# Patient Record
Sex: MALE | Race: BLACK OR AFRICAN AMERICAN | URBAN - METROPOLITAN AREA
[De-identification: names, ages, dates, MRNs, and addresses within clinical notes are randomized per-mention and may not be internally consistent; named-entity substitution may affect disease eponyms.]

---

## 2019-12-12 ENCOUNTER — ED HISTORICAL/CONVERTED ENCOUNTER (OUTPATIENT)
Dept: OTHER | Age: 64
End: 2019-12-12

## 2020-06-23 ENCOUNTER — ED HISTORICAL/CONVERTED ENCOUNTER (OUTPATIENT)
Dept: OTHER | Age: 65
End: 2020-06-23

## 2020-06-26 ENCOUNTER — ED HISTORICAL/CONVERTED ENCOUNTER (OUTPATIENT)
Dept: OTHER | Age: 65
End: 2020-06-26

## 2020-06-29 ENCOUNTER — IP HISTORICAL/CONVERTED ENCOUNTER (OUTPATIENT)
Dept: OTHER | Age: 65
End: 2020-06-29

## 2020-07-28 ENCOUNTER — IP HISTORICAL/CONVERTED ENCOUNTER (OUTPATIENT)
Dept: OTHER | Age: 65
End: 2020-07-28

## 2022-04-04 ENCOUNTER — HOSPITAL ENCOUNTER (EMERGENCY)
Age: 67
Discharge: HOME | End: 2022-04-04
Attending: STUDENT IN AN ORGANIZED HEALTH CARE EDUCATION/TRAINING PROGRAM
Payer: COMMERCIAL

## 2022-04-04 VITALS
WEIGHT: 205 LBS | SYSTOLIC BLOOD PRESSURE: 154 MMHG | HEART RATE: 98 BPM | HEIGHT: 74 IN | OXYGEN SATURATION: 97 % | RESPIRATION RATE: 20 BRPM | TEMPERATURE: 98.2 F | DIASTOLIC BLOOD PRESSURE: 90 MMHG | BODY MASS INDEX: 26.31 KG/M2

## 2022-04-04 DIAGNOSIS — R55 NEAR SYNCOPE: Primary | ICD-10-CM

## 2022-04-04 LAB
ANION GAP SERPL CALC-SCNC: 5 MMOL/L (ref 5–15)
ATRIAL RATE: 84 BPM
BASOPHILS # BLD: 0 K/UL (ref 0–0.1)
BASOPHILS NFR BLD: 0 % (ref 0–1)
BUN SERPL-MCNC: 17 MG/DL (ref 6–20)
BUN/CREAT SERPL: 18 (ref 12–20)
CA-I BLD-MCNC: 8.6 MG/DL (ref 8.5–10.1)
CALCULATED P AXIS, ECG09: 46 DEGREES
CALCULATED R AXIS, ECG10: -17 DEGREES
CALCULATED T AXIS, ECG11: 46 DEGREES
CHLORIDE SERPL-SCNC: 106 MMOL/L (ref 97–108)
CO2 SERPL-SCNC: 27 MMOL/L (ref 21–32)
CREAT SERPL-MCNC: 0.97 MG/DL (ref 0.7–1.3)
DIAGNOSIS, 93000: NORMAL
DIFFERENTIAL METHOD BLD: ABNORMAL
EOSINOPHIL # BLD: 0 K/UL (ref 0–0.4)
EOSINOPHIL NFR BLD: 1 % (ref 0–7)
ERYTHROCYTE [DISTWIDTH] IN BLOOD BY AUTOMATED COUNT: 15.7 % (ref 11.5–14.5)
GLUCOSE SERPL-MCNC: 118 MG/DL (ref 65–100)
HCT VFR BLD AUTO: 47.5 % (ref 36.6–50.3)
HGB BLD-MCNC: 15 G/DL (ref 12.1–17)
IMM GRANULOCYTES # BLD AUTO: 0 K/UL (ref 0–0.04)
IMM GRANULOCYTES NFR BLD AUTO: 1 % (ref 0–0.5)
LYMPHOCYTES # BLD: 0.9 K/UL (ref 0.8–3.5)
LYMPHOCYTES NFR BLD: 20 % (ref 12–49)
MCH RBC QN AUTO: 26.7 PG (ref 26–34)
MCHC RBC AUTO-ENTMCNC: 31.6 G/DL (ref 30–36.5)
MCV RBC AUTO: 84.7 FL (ref 80–99)
MONOCYTES # BLD: 0.4 K/UL (ref 0–1)
MONOCYTES NFR BLD: 10 % (ref 5–13)
NEUTS SEG # BLD: 3 K/UL (ref 1.8–8)
NEUTS SEG NFR BLD: 68 % (ref 32–75)
NRBC # BLD: 0 K/UL (ref 0–0.01)
NRBC BLD-RTO: 0 PER 100 WBC
P-R INTERVAL, ECG05: 240 MS
PLATELET # BLD AUTO: 182 K/UL (ref 150–400)
PMV BLD AUTO: 9.3 FL (ref 8.9–12.9)
POTASSIUM SERPL-SCNC: 4.1 MMOL/L (ref 3.5–5.1)
Q-T INTERVAL, ECG07: 366 MS
QRS DURATION, ECG06: 80 MS
QTC CALCULATION (BEZET), ECG08: 432 MS
RBC # BLD AUTO: 5.61 M/UL (ref 4.1–5.7)
SODIUM SERPL-SCNC: 138 MMOL/L (ref 136–145)
TROPONIN-HIGH SENSITIVITY: 12 NG/L (ref 0–76)
VENTRICULAR RATE, ECG03: 84 BPM
WBC # BLD AUTO: 4.4 K/UL (ref 4.1–11.1)

## 2022-04-04 PROCEDURE — 85025 COMPLETE CBC W/AUTO DIFF WBC: CPT

## 2022-04-04 PROCEDURE — 99284 EMERGENCY DEPT VISIT MOD MDM: CPT

## 2022-04-04 PROCEDURE — 93005 ELECTROCARDIOGRAM TRACING: CPT

## 2022-04-04 PROCEDURE — 84484 ASSAY OF TROPONIN QUANT: CPT

## 2022-04-04 PROCEDURE — 36415 COLL VENOUS BLD VENIPUNCTURE: CPT

## 2022-04-04 PROCEDURE — 80048 BASIC METABOLIC PNL TOTAL CA: CPT

## 2022-04-04 NOTE — ED PROVIDER NOTES
EMERGENCY DEPARTMENT HISTORY AND PHYSICAL EXAM      Date: 4/4/2022  Patient Name: Gregory Echavarria    History of Presenting Illness     Chief Complaint   Patient presents with    Syncope       History Provided By: Patient    HPI: Gregory Echavarria, 61 y.o. male with a past medical history significant diabetes presents to the ED with cc of weakness, near syncope. Patient states that he was at [address]11 this morning approximately 8 AM, felt suddenly weak, had to sit down on ground. Patient states that episode resolved after several seconds. Did not actually lose consciousness, did not suffer any traumatic injury. Patient denies any chest pains palpitations or shortness of breath. Currently patient states he feels well. There are no other complaints, changes, or physical findings at this time. PCP: None    No current facility-administered medications on file prior to encounter. No current outpatient medications on file prior to encounter. Past History     Past Medical History:  Past Medical History:   Diagnosis Date    Diabetes ([facility] [address]     Psychiatric disorder        Past Surgical History:  History reviewed. No pertinent surgical history. Family History:  History reviewed. No pertinent family history. Social History:  Social History     Tobacco Use    Smoking status: Current Every Day Smoker    Smokeless tobacco: Former User   Substance Use Topics    Alcohol use: Yes     Comment: Occaisional    Drug use: Not Currently       Allergies:  No Known Allergies      Review of Systems     Review of Systems   Constitutional: Negative for activity change, appetite change, chills and fever. HENT: Negative for congestion, sore throat and trouble swallowing. Eyes: Negative for photophobia and visual disturbance. Respiratory: Negative for cough, chest tightness and shortness of breath. Cardiovascular: Negative for chest pain and palpitations.    Gastrointestinal: Negative for abdominal pain and nausea. Genitourinary: Negative for dysuria and flank pain. Musculoskeletal: Negative for arthralgias and neck pain. Skin: Negative for color change and pallor. Neurological: Positive for light-headedness. Negative for dizziness, seizures, syncope, weakness, numbness and headaches. Physical Exam     Physical Exam  Vitals and nursing note reviewed. Constitutional:       Appearance: Normal appearance. He is normal weight. HENT:      Head: Normocephalic and atraumatic. Nose: Nose normal.      Mouth/Throat:      Mouth: Mucous membranes are moist.   Eyes:      Extraocular Movements: Extraocular movements intact. Pupils: Pupils are equal, round, and reactive to light. Cardiovascular:      Rate and Rhythm: Normal rate and regular rhythm. Pulses: Normal pulses. Heart sounds: Normal heart sounds. Pulmonary:      Breath sounds: Normal breath sounds. Abdominal:      General: Abdomen is flat. Bowel sounds are normal.      Palpations: Abdomen is soft. Musculoskeletal:         General: No swelling or tenderness. Normal range of motion. Cervical back: Normal range of motion and neck supple. Skin:     General: Skin is warm and dry. Capillary Refill: Capillary refill takes less than 2 seconds. Neurological:      General: No focal deficit present. Mental Status: He is alert and oriented to person, place, and time. Cranial Nerves: No cranial nerve deficit. Sensory: No sensory deficit. Motor: No weakness.    Psychiatric:         Mood and Affect: Mood normal.         Behavior: Behavior normal.         Diagnostic Study Results     Labs -     Recent Results (from the past 12 hour(s))   EKG, 12 LEAD, INITIAL    Collection Time: 04/04/22  9:40 AM   Result Value Ref Range    Ventricular Rate 84 BPM    Atrial Rate 84 BPM    P-R Interval 240 ms    QRS Duration 80 ms    Q-T Interval 366 ms    QTC Calculation (Bezet) 432 ms    Calculated P Axis 46 degrees Calculated R Axis -17 degrees    Calculated T Axis 46 degrees    Diagnosis       Sinus rhythm with 1st degree A-V block  Otherwise normal ECG  When compared with ECG of 04-APR-2022 09:39, (Unconfirmed)  Abberant conduction is no longer Present  Confirmed by Su Santos ([id number]) on 4/4/2022 11:58:59 AM     CBC WITH AUTOMATED DIFF    Collection Time: 04/04/22  9:52 AM   Result Value Ref Range    WBC 4.4 4.1 - 11.1 K/uL    RBC 5.61 4.10 - 5.70 M/uL    HGB 15.0 12.1 - 17.0 g/dL    HCT 47.5 36.6 - 50.3 %    MCV 84.7 80.0 - 99.0 FL    MCH 26.7 26.0 - 34.0 PG    MCHC 31.6 30.0 - 36.5 g/dL    RDW 15.7 (H) 11.5 - 14.5 %    PLATELET 913 172 - 047 K/uL    MPV 9.3 8.9 - 12.9 FL    NRBC 0.0 0.0  WBC    ABSOLUTE NRBC 0.00 0.00 - 0.01 K/uL    NEUTROPHILS 68 32 - 75 %    LYMPHOCYTES 20 12 - 49 %    MONOCYTES 10 5 - 13 %    EOSINOPHILS 1 0 - 7 %    BASOPHILS 0 0 - 1 %    IMMATURE GRANULOCYTES 1 (H) 0 - 0.5 %    ABS. NEUTROPHILS 3.0 1.8 - 8.0 K/UL    ABS. LYMPHOCYTES 0.9 0.8 - 3.5 K/UL    ABS. MONOCYTES 0.4 0.0 - 1.0 K/UL    ABS. EOSINOPHILS 0.0 0.0 - 0.4 K/UL    ABS. BASOPHILS 0.0 0.0 - 0.1 K/UL    ABS. IMM.  GRANS. 0.0 0.00 - 0.04 K/UL    DF AUTOMATED     METABOLIC PANEL, BASIC    Collection Time: 04/04/22  9:52 AM   Result Value Ref Range    Sodium 138 136 - 145 mmol/L    Potassium 4.1 3.5 - 5.1 mmol/L    Chloride 106 97 - 108 mmol/L    CO2 27 21 - 32 mmol/L    Anion gap 5 5 - 15 mmol/L    Glucose 118 (H) 65 - 100 mg/dL    BUN 17 6 - 20 mg/dL    Creatinine 0.97 0.70 - 1.30 mg/dL    BUN/Creatinine ratio 18 12 - 20      GFR est AA >60 >60 ml/min/1.73m2    GFR est non-AA >60 >60 ml/min/1.73m2    Calcium 8.6 8.5 - 10.1 mg/dL   TROPONIN-HIGH SENSITIVITY    Collection Time: 04/04/22 10:57 AM   Result Value Ref Range    Troponin-High Sensitivity 12 0 - 76 ng/L       Radiologic Studies -   @[email or web address]@  CT Results  (Last 48 hours)    None        CXR Results  (Last 48 hours)    None            Medical Decision Making   I am the first provider for this patient. I reviewed the vital signs, available nursing notes, past medical history, past surgical history, family history and social history. Vital Signs-Reviewed the patient's vital signs. Patient Vitals for the past 12 hrs:   Temp Pulse Resp BP SpO2   04/04/22 1241 98.2 °F (36.8 °C) 98 20 (!) 154/90 97 %   04/04/22 0945 98.5 °F (36.9 °C) 86 21 (!) 150/91 99 %       EKG: Normal sinus rhythm 84, no ST elevations, first-degree heart block    Records Reviewed: Nursing Notes    The patient presents with syncope with a differential diagnosis of vasovagal event, cardiac arrhythmia, heart block,      Provider Notes (Medical Decision Making):     MDM   51-year-old male, no significant past medical history presents to emergency department for near syncopal event today while standing outside of [address], patient did not actually lose consciousness or states felt very weak and dizzy. Episode lasted several seconds, currently patient completely asymptomatic denies any chest pain palpitations or shortness of breath. Physical shows well-appearing male, no distress, normotensive, nonfocal cardiovascular exam normal neurological exam.  EKG shows first-degree heart block otherwise no significant abnormalities    Basic lab work drawn including cardiac enzymes, no acute electrolyte abnormalities and normal cardiac enzymes, as patient remained asymptomatic will discharge patient home, instructed to follow-up with primary care physician in the next week, return to emergency department if any worsening weakness dizziness or loss of consciousness. ED Course:   Initial assessment performed. The patients presenting problems have been discussed, and they are in agreement with the care plan formulated and outlined with them. I have encouraged them to ask questions as they arise throughout their visit. PROCEDURES  Procedures         PLAN:  1.  There are no discharge medications for this patient. 2.   Follow-up Information     Follow up With Specialties Details Why Catrachito Rosales MD Family Medicine In 1 week  [address] [facility] [id number] [phone]          Return to ED if worse     Diagnosis     Clinical Impression:   1.  Near syncope

## 2023-03-27 ENCOUNTER — HOSPITAL ENCOUNTER (EMERGENCY)
Age: 68
Discharge: HOME | End: 2023-03-27
Attending: EMERGENCY MEDICINE
Payer: COMMERCIAL

## 2023-03-27 VITALS
SYSTOLIC BLOOD PRESSURE: 148 MMHG | WEIGHT: 225 LBS | RESPIRATION RATE: 18 BRPM | OXYGEN SATURATION: 99 % | HEART RATE: 94 BPM | BODY MASS INDEX: 27.98 KG/M2 | DIASTOLIC BLOOD PRESSURE: 90 MMHG | TEMPERATURE: 98.7 F | HEIGHT: 75 IN

## 2023-03-27 DIAGNOSIS — S20.229A CONTUSION OF BACK, UNSPECIFIED LATERALITY, INITIAL ENCOUNTER: ICD-10-CM

## 2023-03-27 DIAGNOSIS — R19.7 DIARRHEA, UNSPECIFIED TYPE: ICD-10-CM
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
## 2023-03-27 LAB
ALBUMIN SERPL-MCNC: 3.8 G/DL (ref 3.5–5)
ALBUMIN/GLOB SERPL: 0.9 (ref 1.1–2.2)
ALP SERPL-CCNC: 92 U/L (ref 45–117)
ALT SERPL-CCNC: 44 U/L (ref 12–78)
ANION GAP SERPL CALC-SCNC: 7 MMOL/L (ref 5–15)
AST SERPL W P-5'-P-CCNC: 38 U/L (ref 15–37)
BASOPHILS # BLD: 0 K/UL (ref 0–0.1)
BASOPHILS NFR BLD: 0 % (ref 0–1)
BILIRUB SERPL-MCNC: 0.2 MG/DL (ref 0.2–1)
BUN SERPL-MCNC: 16 MG/DL (ref 6–20)
BUN/CREAT SERPL: 19 (ref 12–20)
CA-I BLD-MCNC: 9.7 MG/DL (ref 8.5–10.1)
CHLORIDE SERPL-SCNC: 102 MMOL/L (ref 97–108)
CO2 SERPL-SCNC: 31 MMOL/L (ref 21–32)
CREAT SERPL-MCNC: 0.85 MG/DL (ref 0.7–1.3)
DIFFERENTIAL METHOD BLD: ABNORMAL
EOSINOPHIL # BLD: 0.1 K/UL (ref 0–0.4)
EOSINOPHIL NFR BLD: 1 % (ref 0–7)
ERYTHROCYTE [DISTWIDTH] IN BLOOD BY AUTOMATED COUNT: 14.4 % (ref 11.5–14.5)
GLOBULIN SER CALC-MCNC: 4.2 G/DL (ref 2–4)
GLUCOSE BLD STRIP.AUTO-MCNC: 142 MG/DL (ref 65–100)
GLUCOSE SERPL-MCNC: 151 MG/DL (ref 65–100)
HCT VFR BLD AUTO: 42.2 % (ref 36.6–50.3)
HGB BLD-MCNC: 13.2 G/DL (ref 12.1–17)
IMM GRANULOCYTES # BLD AUTO: 0 K/UL (ref 0–0.04)
IMM GRANULOCYTES NFR BLD AUTO: 0 % (ref 0–0.5)
LYMPHOCYTES # BLD: 1.4 K/UL (ref 0.8–3.5)
LYMPHOCYTES NFR BLD: 16 % (ref 12–49)
MCH RBC QN AUTO: 26.8 PG (ref 26–34)
MCHC RBC AUTO-ENTMCNC: 31.3 G/DL (ref 30–36.5)
MCV RBC AUTO: 85.8 FL (ref 80–99)
MONOCYTES # BLD: 0.6 K/UL (ref 0–1)
MONOCYTES NFR BLD: 7 % (ref 5–13)
NEUTS SEG # BLD: 6.3 K/UL (ref 1.8–8)
NEUTS SEG NFR BLD: 76 % (ref 32–75)
PERFORMED BY, TECHID: ABNORMAL
PLATELET # BLD AUTO: 198 K/UL (ref 150–400)
PMV BLD AUTO: 8.3 FL (ref 8.9–12.9)
POTASSIUM SERPL-SCNC: 4.1 MMOL/L (ref 3.5–5.1)
PROT SERPL-MCNC: 8 G/DL (ref 6.4–8.2)
RBC # BLD AUTO: 4.92 M/UL (ref 4.1–5.7)
SODIUM SERPL-SCNC: 140 MMOL/L (ref 136–145)
WBC # BLD AUTO: 8.3 K/UL (ref 4.1–11.1)

## 2023-03-27 PROCEDURE — 82962 GLUCOSE BLOOD TEST: CPT

## 2023-03-27 PROCEDURE — 93005 ELECTROCARDIOGRAM TRACING: CPT

## 2023-03-27 PROCEDURE — 80053 COMPREHEN METABOLIC PANEL: CPT

## 2023-03-27 PROCEDURE — 74011250637 HC RX REV CODE- 250/637: Performed by: EMERGENCY MEDICINE

## 2023-03-27 PROCEDURE — 85025 COMPLETE CBC W/AUTO DIFF WBC: CPT

## 2023-03-27 PROCEDURE — 99284 EMERGENCY DEPT VISIT MOD MDM: CPT

## 2023-03-27 RX ORDER — LOPERAMIDE HYDROCHLORIDE 2 MG/1
4 CAPSULE ORAL
Status: COMPLETED | OUTPATIENT
Start: 2023-03-27 | End: 2023-03-27

## 2023-03-27 RX ADMIN — LOPERAMIDE HYDROCHLORIDE 4 MG: 2 CAPSULE ORAL at 13:08

## 2023-03-27 NOTE — ED NOTES
We had to clean feces and urine off pt, dried on legs buttocks and scrotal area, all clothes were covered in feces and urine, pt states he sometimes doesn't know until it's too late.

## 2023-03-27 NOTE — ED NOTES
Pt states he fell earlier tripping on leg of stove falling on arms, denies any pain, no swelling noted to arms or legs, denies LOC

## 2023-03-27 NOTE — ED TRIAGE NOTES
Pt slipped on wet pavement and fell on left hip area, denies LOC, denies hitting head, pt was walking upon EMS arrival, pt in no distress, PT had voided and bm when he fell,

## 2023-03-27 NOTE — DISCHARGE INSTRUCTIONS
Thank you! Thank you for allowing me to care for you in the emergency department. It is my goal to provide you with excellent care. If you have not received excellent quality care, please ask to speak to the nurse manager. Please fill out the survey that will come to you by mail or email since we listen to your feedback! Below you will find a list of your tests from today's visit. Should you have any questions, please do not hesitate to call the emergency department. Labs  Recent Results (from the past 12 hour(s))   CBC WITH AUTOMATED DIFF    Collection Time: 03/27/23  1:07 PM   Result Value Ref Range    WBC 8.3 4.1 - 11.1 K/uL    RBC 4.92 4.10 - 5.70 M/uL    HGB 13.2 12.1 - 17.0 g/dL    HCT 42.2 36.6 - 50.3 %    MCV 85.8 80.0 - 99.0 FL    MCH 26.8 26.0 - 34.0 PG    MCHC 31.3 30.0 - 36.5 g/dL    RDW 14.4 11.5 - 14.5 %    PLATELET 342 791 - 808 K/uL    MPV 8.3 (L) 8.9 - 12.9 FL    NEUTROPHILS 76 (H) 32 - 75 %    LYMPHOCYTES 16 12 - 49 %    MONOCYTES 7 5 - 13 %    EOSINOPHILS 1 0 - 7 %    BASOPHILS 0 0 - 1 %    IMMATURE GRANULOCYTES 0 0.0 - 0.5 %    ABS. NEUTROPHILS 6.3 1.8 - 8.0 K/UL    ABS. LYMPHOCYTES 1.4 0.8 - 3.5 K/UL    ABS. MONOCYTES 0.6 0.0 - 1.0 K/UL    ABS. EOSINOPHILS 0.1 0.0 - 0.4 K/UL    ABS. BASOPHILS 0.0 0.0 - 0.1 K/UL    ABS. IMM. GRANS. 0.0 0.00 - 0.04 K/UL    DF AUTOMATED     GLUCOSE, POC    Collection Time: 03/27/23  1:13 PM   Result Value Ref Range    Glucose (POC) 142 (H) 65 - 100 mg/dL    Performed by Elsie Mckinney        Radiologic Studies  No orders to display     CT Results  (Last 48 hours)      None          CXR Results  (Last 48 hours)      None          ------------------------------------------------------------------------------------------------------------  The exam and treatment you received in the Emergency Department were for an urgent problem and are not intended as complete care.  It is important that you follow-up with a doctor, nurse practitioner, or physician assistant to:  (1) confirm your diagnosis,  (2) re-evaluation of changes in your illness and treatment, and  (3) for ongoing care. Please take your discharge instructions with you when you go to your follow-up appointment. If you have any problem arranging a follow-up appointment, contact the Emergency Department. If your symptoms become worse or you do not improve as expected and you are unable to reach your health care provider, please return to the Emergency Department. We are available 24 hours a day. If a prescription has been provided, please have it filled as soon as possible to prevent a delay in treatment. If you have any questions or reservations about taking the medication due to side effects or interactions with other medications, please call your primary care provider or contact the ER.

## 2023-03-27 NOTE — ED NOTES
Walked in pt room, pt had bm on floor in multiple places, he said he couldn't wait, that he had to go.

## 2023-03-27 NOTE — ED NOTES
Pt found smoking a cigarette when I walked in, had him put it out and took all lighters from him. Advised him that no smoking is allowed.

## 2023-03-28 LAB
ATRIAL RATE: 96 BPM
CALCULATED P AXIS, ECG09: 61 DEGREES
CALCULATED R AXIS, ECG10: 28 DEGREES
CALCULATED T AXIS, ECG11: 53 DEGREES
DIAGNOSIS, 93000: NORMAL
P-R INTERVAL, ECG05: 192 MS
Q-T INTERVAL, ECG07: 344 MS
QRS DURATION, ECG06: 68 MS
QTC CALCULATION (BEZET), ECG08: 434 MS
VENTRICULAR RATE, ECG03: 96 BPM

## 2023-03-28 NOTE — ED PROVIDER NOTES
[facility] EMERGENCY DEPARTMENT  EMERGENCY DEPARTMENT HISTORY AND PHYSICAL EXAM      Date: 3/27/2023  Patient Name: Danial Alaniz  MRN: [id number]  [place]: 1955  Date of evaluation: 3/27/2023  Provider: Kaylynn Ortiz MD   Note Started: 5:40 PM 3/28/23    HISTORY OF PRESENT ILLNESS     Chief Complaint   Patient presents with    LOW BACK PAIN       History Provided By: Patient    HPI: Danial Alaniz is a 74 y.o. male with DM, psychiatric illness brought in by EMS after slipping on wet concrete. He reports he fell twice, once on way to [employer] but got up and continued to go to [employer] and the other time when leaving he [employer] and crossing street to catch bus home. He denies hitting his head. He initially complained of low back pain to EMS stating he landed on it but at this time has no complaints. He was up and ambulatory when EMS arrived. He did stool on himself per EMS upon landing. HE reports sometimes having trouble controlling his bowels but this per his report is not new. He denies any recent GI illness, NV. He denies any leg weakness, bowel or bladder dysfunction that it new since fall or saddle anesthesia. He is not on blood thinners. PAST MEDICAL HISTORY   Past Medical History:  Past Medical History:   Diagnosis Date    Diabetes [facility])     Psychiatric disorder        Past Surgical History:  No past surgical history on file. Family History:  History reviewed. No pertinent family history. Social History:  Social History     Tobacco Use    Smoking status: Every Day    Smokeless tobacco: Former   Substance Use Topics    Alcohol use: Yes     Comment: Occaisional    Drug use: Not Currently       Allergies:  No Known Allergies    PCP: None    Current Meds:   There are no discharge medications for this patient.       PHYSICAL EXAM     ED Triage Vitals [03/27/23 1055]   ED Encounter Vitals Group      BP (!) 155/93      Pulse (Heart Rate) (!) 102      Resp Rate 18      Temp 98.7 °F (37.1 °C)      Temp src O2 Sat (%) 99 %      Weight 225 lb      Height 6' 2.5\"      Physical Exam  Vitals and nursing note reviewed. Constitutional:       General: He is not in acute distress. Appearance: He is not ill-appearing. HENT:      Head: Atraumatic. Nose: Nose normal.   Eyes:      Extraocular Movements: Extraocular movements intact. Conjunctiva/sclera: Conjunctivae normal.      Pupils: Pupils are equal, round, and reactive to light. Cardiovascular:      Rate and Rhythm: Normal rate and regular rhythm. Pulmonary:      Effort: Pulmonary effort is normal.   Chest:      Chest wall: No tenderness. Abdominal:      General: Bowel sounds are normal.      Palpations: Abdomen is soft. Tenderness: There is no abdominal tenderness. There is no guarding or rebound. Musculoskeletal:         General: No tenderness. Normal range of motion. Cervical back: Normal range of motion. No tenderness. Skin:     General: Skin is warm. Findings: No bruising or erythema. Comments: Cracked callus base on right great toe  Poor foot care noted   Neurological:      Mental Status: He is alert and oriented to person, place, and time. Psychiatric:         Mood and Affect: Mood normal.         SCREENINGS              LAB, EKG AND DIAGNOSTIC RESULTS   Labs:New Results - Labs    Updated   Order    03/27/23 1315  GLUCOSE, POC   Collected: 03/27/23 1313  Final result  Specimen: Whole Blood     Glucose (POC) 142 High  mg/dL    Performed by Jasmin Juan          70/47/35 8737  METABOLIC PANEL, COMPREHENSIVE   Collected: 03/27/23 1307  Final result  Specimen: Serum or Plasma     Sodium 140 mmol/L   Potassium 4.1 mmol/L   Chloride 102 mmol/L   CO2 31 mmol/L   Anion gap 7 mmol/L   Glucose 151 High  mg/dL   BUN 16 mg/dL   Creatinine 0.85 mg/dL   BUN/Creatinine ratio 19     eGFR >60 ml/min/1.73m2    Calcium 9.7 mg/dL   Bilirubin, total 0.2 mg/dL   AST (SGOT) 38 High  U/L   ALT (SGPT) 44 U/L   Alk.  phosphatase 92 U/L Protein, total 8.0 g/dL   Albumin 3.8 g/dL   Globulin 4.2 High  g/dL   A-G Ratio 0.9 Low             03/27/23 1314  CBC WITH AUTOMATED DIFF   Collected: 03/27/23 1307  Final result  Specimen: Whole Blood     WBC 8.3 K/uL   RBC 4.92 M/uL   HGB 13.2 g/dL   HCT 42.2 %   MCV 85.8 FL   MCH 26.8 PG   MCHC 31.3 g/dL   RDW 14.4 %   PLATELET 112 K/uL   MPV 8.3 Low  FL   NEUTROPHILS 76 High  %   LYMPHOCYTES 16 %   MONOCYTES 7 %   EOSINOPHILS 1 %   BASOPHILS 0 %   IMMATURE GRANULOCYTES 0 %   ABS. NEUTROPHILS 6.3 K/UL   ABS. LYMPHOCYTES 1.4 K/UL   ABS. MONOCYTES 0.6 K/UL   ABS. EOSINOPHILS 0.1 K/UL   ABS. BASOPHILS 0.0 K/UL   ABS. IMM. GRANS. 0.0 K/UL   DF AUTOMATED                EKG: Initial EKG interpreted by me. NSR, Rate 90, occ PAC    Radiologic Studies:  Non-plain film images such as CT, Ultrasound and MRI are read by the radiologist. Plain radiographic images are visualized and preliminarily interpreted by the ED Physician with the following findings: Not Applicable    Interpretation per the Radiologist below, if available at the time of this note:  No results found. PROCEDURES   Unless otherwise noted below, none. Procedures      CRITICAL CARE TIME   Patient does not meet Critical Care Time, 0 minutes    ED COURSE and DIFFERENTIAL DIAGNOSIS/MDM   CC/HPI Summary, DDx, ED Course, and Reassessment: Pt had mechanical fall with some low back pain from likely contusion without red flags. He has no complaints at this time. Do not feel imaging is warranted. Reassessment: Prior to pts discharge he had another loose BM so decision made to escalate and obtain some screening labs for possible infection, DENNY, CKD, or elevated blood sugar. Reassessment: No additional diarrhea after imodium. Pt requesting to go home.     Records Reviewed (source and summary of external notes): Prior medical records and Nursing notes    Vitals:    Vitals:    03/27/23 1055 03/27/23 1335   BP: (!) 155/93 (!) 148/90   Pulse: (!) 102 94 Resp: 18 18   Temp: 98.7 °F (37.1 °C)    SpO2: 99% 99%   Weight: 102.1 kg (225 lb)    Height: 6' 2.5\" (1.892 m)         ED COURSE       Disposition Considerations (Tests not done, Shared Decision Making, Pt Expectation of Test or Treatment.): Not Applicable    Patient was given the following medications:  Medications   loperamide (IMODIUM) capsule 4 mg (4 mg Oral Given 3/27/23 1308)       CONSULTS: (Who and What was discussed)  None     Social Determinants affecting Dx or Tx: Patient lacks support at home or lives alone. Smoking Cessation: Smoking Cessation Counseling  Discussed the risks of smoking tobacco products and the long term sequelae of tobacco use with the patient such as increased risk of heart attack, stroke, peripheral artery disease and cancer. The patient verbalized their understanding and were provided resources if asked. Counseled patient for approximately 3 minutes. FINAL IMPRESSION     1. Fall, initial encounter    2. Diarrhea, unspecified type    3. Contusion of back, unspecified laterality, initial encounter          DISPOSITION/PLAN   Discharged    Discharge Note: The patient is stable for discharge home. The signs, symptoms, diagnosis, and discharge instructions have been discussed, understanding conveyed, and agreed upon. The patient is to follow up as recommended or return to ER should their symptoms worsen. PATIENT REFERRED TO:  Follow-up Information       Follow up With Specialties Details Why Contact Info        FOLLOW-UP UP WITH PCP    Patricia Sidhu DPM Podiatry Schedule an appointment as soon as possible for a visit  [facility] [address]  [phone]                DISCHARGE MEDICATIONS:  There are no discharge medications for this patient. DISCONTINUED MEDICATIONS:  There are no discharge medications for this patient.       I am the Primary Clinician of Record: Rafael Smith MD (electronically signed)    (Please note that parts of this dictation were completed with voice recognition software. Quite often unanticipated grammatical, syntax, homophones, and other interpretive errors are inadvertently transcribed by the computer software. Please disregards these errors.  Please excuse any errors that have escaped final proofreading.)

## 2023-03-29 ENCOUNTER — HOSPITAL ENCOUNTER (EMERGENCY)
Age: 68
Discharge: HOME | End: 2023-03-29
Attending: EMERGENCY MEDICINE | Admitting: EMERGENCY MEDICINE
Payer: COMMERCIAL

## 2023-03-29 VITALS
OXYGEN SATURATION: 100 % | DIASTOLIC BLOOD PRESSURE: 71 MMHG | SYSTOLIC BLOOD PRESSURE: 140 MMHG | HEART RATE: 113 BPM | TEMPERATURE: 98.7 F | WEIGHT: 225 LBS | RESPIRATION RATE: 18 BRPM | BODY MASS INDEX: 28.88 KG/M2 | HEIGHT: 74 IN

## 2023-03-29 DIAGNOSIS — R05.1 ACUTE COUGH: ICD-10-CM

## 2023-03-29 DIAGNOSIS — M25.561 ACUTE PAIN OF RIGHT KNEE: ICD-10-CM

## 2023-03-29 DIAGNOSIS — Z91.81 FALLS INFREQUENTLY: Primary | ICD-10-CM

## 2023-03-29 PROCEDURE — 99283 EMERGENCY DEPT VISIT LOW MDM: CPT | Performed by: EMERGENCY MEDICINE

## 2023-03-29 RX ORDER — AZITHROMYCIN 500 MG/1
500 TABLET, FILM COATED ORAL DAILY
Qty: 5 TABLET | Refills: 0 | Status: SHIPPED | OUTPATIENT
Start: 2023-03-29 | End: 2023-04-03

## 2023-03-29 NOTE — ED TRIAGE NOTES
73 Yo p/w with 3 hours right leg pain, after tripping on tree stump, denies head trauma, numbness, tingling. States he has had a non productive cough x 1 week.

## 2023-03-29 NOTE — ED PROVIDER NOTES
[facility] EMERGENCY DEPARTMENT  EMERGENCY DEPARTMENT HISTORY AND PHYSICAL EXAM      Date: 3/29/2023  Patient Name: Georgie Jerez  MRN: [id number]  [place] 1955  Date of evaluation: 3/29/2023  Provider: Aspen Chadwick DO   Note Started: 12:40 PM 3/29/23    History of Presenting Illness     Chief Complaint   Patient presents with    Leg Pain    Cough     History Provided By: Patient    HPI: Georgie Jerez is a 74 y.o. male with history of diabetes and psychiatric disorder who presents with right ankle pain and falls. He also has knee pain. States that he has had a couple ground-level falls. No head injury. He has pain that is moderate in the knees and right ankle. He has some back pain as well. Patient is homeless. Past History     Past Medical History:  Past Medical History:   Diagnosis Date    Diabetes [facility])     Psychiatric disorder        Past Surgical History:  History reviewed. No pertinent surgical history. Family History:  History reviewed. No pertinent family history. Social History:  Social History     Tobacco Use    Smoking status: Every Day    Smokeless tobacco: Former   Substance Use Topics    Alcohol use: Yes     Comment: Occaisional    Drug use: Not Currently       Allergies:  No Known Allergies    PCP: None    Current Meds:   Discharge Medication List as of 3/29/2023  1:15 PM          Physical Exam   Vitals  ED Triage Vitals [03/29/23 1237]   ED Encounter Vitals Group      BP (!) 140/71      Pulse (Heart Rate) (!) 113      Resp Rate 18      Temp 98.7 °F (37.1 °C)      Temp src       O2 Sat (%) 100 %      Weight 225 lb      Height 6' 2\"      Exam  Constitutional: No acute distress. Well-nourished. Skin: No rash. ENT: No rhinorrhea. No cough. Head is normocephalic and atraumatic. Eye: No proptosis or conjunctival injections. Respiratory: No apparent respiratory distress. Gastrointestinal: Nondistended. Musculoskeletal: No obvious bony deformities.   No tenderness to the ankles or knees. No midline lumbar tenderness. Psychiatric: Cooperative. Patient is disheveled. There is a musty smell. SCREENINGS               No data recorded        Lab and Diagnostic Study Results   Labs -   No results found for this or any previous visit (from the past 12 hour(s)). EKG: Initial EKG interpreted by me. Interpretation:     Radiologic Studies:  Non-plain film images such as CT, Ultrasound and MRI are read by the radiologist. Plain radiographic images are visualized and preliminarily interpreted by the ED Provider with the below findings:      Interpretation per the radiologist below, if available at the time of this note:  No results found. MDM (EMERGENCY DEPARTMENT COURSE and DIFFERENTIAL DIAGNOSIS)   Vitals:    Vitals:    03/29/23 1237   BP: (!) 140/71   Pulse: (!) 113   Resp: 18   Temp: 98.7 °F (37.1 °C)   SpO2: 100%   Weight: 102.1 kg (225 lb)   Height: 6' 2\" (1.88 m)        Patient was given the following medications:  Medications - No data to display    CONSULTS: (who and what was discussed)  None       Social Determinants affecting Dx or Tx:   Counseling:     Records Reviewed (source and summary of external notes): Nursing notes. CC/HPI Summary: Presents with fall and cough  DDx: URI, pneumonia, fall, arthritis, contusion  ED Course and Reassessment:   I do not think patient requires any imaging. He does have a productive cough. I will prescribe azithromycin for this. Patient is homeless. He is looking for transportation. I do not think he needs any imaging regarding his fall. Low suspicion for any fractures. Discharged home. Disposition/Plan   Disposition: discharged    DISCHARGE PLAN:  1. There are no discharge medications for this patient.     2.   Follow-up Information       Follow up With Specialties Details Why Contact Info    [address] [place] [address] Emergency Medicine Go today As soon as possible if symptoms worsen [address] [place] [id number]  [phone]          3. Return to ED if worse   4. Discharge Medication List as of 3/29/2023  1:15 PM             PATIENT REFERRED TO:  Follow-up Information       Follow up With Specialties Details Why Contact Info    [address] [facility] Emergency Medicine Go today As soon as possible if symptoms worsen [address] [id number] [phone]             DISCONTINUED MEDICATIONS:  Discharge Medication List as of 3/29/2023  1:15 PM          Clinical Impression   Clinical Impression:   1. Falls infrequently    2. Acute pain of right knee    3. Acute cough           Titi Johnson DO    I am the Primary Clinician of Record. Titi Johnson DO (electronically signed)    (Please note that parts of this dictation were completed with voice recognition software. Quite often unanticipated grammatical, syntax, homophones, and other interpretive errors are inadvertently transcribed by the computer software. Please disregards these errors.  Please excuse any errors that have escaped final proofreading.)